# Patient Record
Sex: FEMALE | Race: BLACK OR AFRICAN AMERICAN | NOT HISPANIC OR LATINO | Employment: OTHER | URBAN - METROPOLITAN AREA
[De-identification: names, ages, dates, MRNs, and addresses within clinical notes are randomized per-mention and may not be internally consistent; named-entity substitution may affect disease eponyms.]

---

## 2017-02-16 PROBLEM — N18.2 STAGE 2 CHRONIC KIDNEY DISEASE: Status: ACTIVE | Noted: 2017-02-16

## 2017-02-16 PROBLEM — M79.605 PAIN OF LEFT LOWER EXTREMITY: Status: ACTIVE | Noted: 2017-02-16

## 2017-08-08 ENCOUNTER — HOSPITAL ENCOUNTER (EMERGENCY)
Facility: OTHER | Age: 72
Discharge: HOME | End: 2017-08-08
Attending: EMERGENCY MEDICINE
Payer: MEDICARE

## 2017-08-08 VITALS
SYSTOLIC BLOOD PRESSURE: 205 MMHG | BODY MASS INDEX: 39.69 KG/M2 | HEIGHT: 69 IN | OXYGEN SATURATION: 97 % | TEMPERATURE: 98 F | RESPIRATION RATE: 19 BRPM | HEART RATE: 76 BPM | DIASTOLIC BLOOD PRESSURE: 91 MMHG | WEIGHT: 268 LBS

## 2017-08-08 DIAGNOSIS — R06.02 SOB (SHORTNESS OF BREATH): ICD-10-CM

## 2017-08-08 DIAGNOSIS — R10.9 ABDOMINAL PAIN OF UNKNOWN ETIOLOGY: Primary | ICD-10-CM

## 2017-08-08 LAB
ALBUMIN SERPL BCP-MCNC: 3.6 G/DL
ALP SERPL-CCNC: 79 U/L
ALT SERPL W/O P-5'-P-CCNC: 19 U/L
AMPHET+METHAMPHET UR QL: NEGATIVE
ANION GAP SERPL CALC-SCNC: 11 MMOL/L
AST SERPL-CCNC: 18 U/L
BARBITURATES UR QL SCN>200 NG/ML: NEGATIVE
BASOPHILS # BLD AUTO: 0 K/UL
BASOPHILS NFR BLD: 0 %
BENZODIAZ UR QL SCN>200 NG/ML: NEGATIVE
BILIRUB SERPL-MCNC: 0.9 MG/DL
BILIRUB UR QL STRIP: NEGATIVE
BNP SERPL-MCNC: 159 PG/ML
BUN SERPL-MCNC: 14 MG/DL
BZE UR QL SCN: NEGATIVE
CALCIUM SERPL-MCNC: 9.2 MG/DL
CANNABINOIDS UR QL SCN: NEGATIVE
CHLORIDE SERPL-SCNC: 104 MMOL/L
CLARITY UR: CLEAR
CO2 SERPL-SCNC: 24 MMOL/L
COLOR UR: YELLOW
CREAT SERPL-MCNC: 1.1 MG/DL
CREAT UR-MCNC: 186.9 MG/DL
DIFFERENTIAL METHOD: ABNORMAL
EOSINOPHIL # BLD AUTO: 0 K/UL
EOSINOPHIL NFR BLD: 0.3 %
ERYTHROCYTE [DISTWIDTH] IN BLOOD BY AUTOMATED COUNT: 14.2 %
EST. GFR  (AFRICAN AMERICAN): 58 ML/MIN/1.73 M^2
EST. GFR  (NON AFRICAN AMERICAN): 50 ML/MIN/1.73 M^2
GLUCOSE SERPL-MCNC: 181 MG/DL
GLUCOSE UR QL STRIP: NEGATIVE
HCT VFR BLD AUTO: 45 %
HGB BLD-MCNC: 14.9 G/DL
HGB UR QL STRIP: NEGATIVE
KETONES UR QL STRIP: NEGATIVE
LEUKOCYTE ESTERASE UR QL STRIP: NEGATIVE
LYMPHOCYTES # BLD AUTO: 0.4 K/UL
LYMPHOCYTES NFR BLD: 5.2 %
MCH RBC QN AUTO: 30.8 PG
MCHC RBC AUTO-ENTMCNC: 33.1 G/DL
MCV RBC AUTO: 93 FL
METHADONE UR QL SCN>300 NG/ML: NEGATIVE
MONOCYTES # BLD AUTO: 0.2 K/UL
MONOCYTES NFR BLD: 2.4 %
NEUTROPHILS # BLD AUTO: 6.2 K/UL
NEUTROPHILS NFR BLD: 92 %
NITRITE UR QL STRIP: NEGATIVE
OPIATES UR QL SCN: NEGATIVE
PCP UR QL SCN>25 NG/ML: NEGATIVE
PH UR STRIP: 6 [PH] (ref 5–8)
PLATELET # BLD AUTO: 150 K/UL
PMV BLD AUTO: 12 FL
POCT GLUCOSE: 179 MG/DL (ref 70–110)
POTASSIUM SERPL-SCNC: 4.2 MMOL/L
PROT SERPL-MCNC: 8 G/DL
PROT UR QL STRIP: NEGATIVE
RBC # BLD AUTO: 4.84 M/UL
SODIUM SERPL-SCNC: 139 MMOL/L
SP GR UR STRIP: 1.02 (ref 1–1.03)
TOXICOLOGY INFORMATION: NORMAL
TROPONIN I SERPL DL<=0.01 NG/ML-MCNC: 0.02 NG/ML
URN SPEC COLLECT METH UR: NORMAL
UROBILINOGEN UR STRIP-ACNC: NEGATIVE EU/DL
WBC # BLD AUTO: 6.78 K/UL

## 2017-08-08 PROCEDURE — 82962 GLUCOSE BLOOD TEST: CPT

## 2017-08-08 PROCEDURE — 93010 ELECTROCARDIOGRAM REPORT: CPT | Mod: ,,, | Performed by: INTERNAL MEDICINE

## 2017-08-08 PROCEDURE — 96372 THER/PROPH/DIAG INJ SC/IM: CPT

## 2017-08-08 PROCEDURE — 80053 COMPREHEN METABOLIC PANEL: CPT

## 2017-08-08 PROCEDURE — 96374 THER/PROPH/DIAG INJ IV PUSH: CPT

## 2017-08-08 PROCEDURE — 96375 TX/PRO/DX INJ NEW DRUG ADDON: CPT

## 2017-08-08 PROCEDURE — 84484 ASSAY OF TROPONIN QUANT: CPT

## 2017-08-08 PROCEDURE — 25000003 PHARM REV CODE 250: Performed by: EMERGENCY MEDICINE

## 2017-08-08 PROCEDURE — 99284 EMERGENCY DEPT VISIT MOD MDM: CPT | Mod: 25

## 2017-08-08 PROCEDURE — 80307 DRUG TEST PRSMV CHEM ANLYZR: CPT

## 2017-08-08 PROCEDURE — 85025 COMPLETE CBC W/AUTO DIFF WBC: CPT

## 2017-08-08 PROCEDURE — 81003 URINALYSIS AUTO W/O SCOPE: CPT

## 2017-08-08 PROCEDURE — 93005 ELECTROCARDIOGRAM TRACING: CPT

## 2017-08-08 PROCEDURE — S0028 INJECTION, FAMOTIDINE, 20 MG: HCPCS | Performed by: EMERGENCY MEDICINE

## 2017-08-08 PROCEDURE — 83880 ASSAY OF NATRIURETIC PEPTIDE: CPT

## 2017-08-08 PROCEDURE — 63600175 PHARM REV CODE 636 W HCPCS: Performed by: EMERGENCY MEDICINE

## 2017-08-08 RX ORDER — DICYCLOMINE HYDROCHLORIDE 10 MG/ML
20 INJECTION INTRAMUSCULAR
Status: COMPLETED | OUTPATIENT
Start: 2017-08-08 | End: 2017-08-08

## 2017-08-08 RX ORDER — DICYCLOMINE HYDROCHLORIDE 20 MG/1
20 TABLET ORAL 2 TIMES DAILY
Qty: 20 TABLET | Refills: 0 | Status: SHIPPED | OUTPATIENT
Start: 2017-08-08 | End: 2017-09-07

## 2017-08-08 RX ORDER — HYDROMORPHONE HYDROCHLORIDE 1 MG/ML
0.5 INJECTION, SOLUTION INTRAMUSCULAR; INTRAVENOUS; SUBCUTANEOUS
Status: COMPLETED | OUTPATIENT
Start: 2017-08-08 | End: 2017-08-08

## 2017-08-08 RX ORDER — HYDRALAZINE HYDROCHLORIDE 20 MG/ML
10 INJECTION INTRAMUSCULAR; INTRAVENOUS
Status: COMPLETED | OUTPATIENT
Start: 2017-08-08 | End: 2017-08-08

## 2017-08-08 RX ORDER — ONDANSETRON 4 MG/1
4 TABLET, ORALLY DISINTEGRATING ORAL
Status: COMPLETED | OUTPATIENT
Start: 2017-08-08 | End: 2017-08-08

## 2017-08-08 RX ORDER — FAMOTIDINE 10 MG/ML
20 INJECTION INTRAVENOUS
Status: COMPLETED | OUTPATIENT
Start: 2017-08-08 | End: 2017-08-08

## 2017-08-08 RX ORDER — ONDANSETRON 4 MG/1
4 TABLET, FILM COATED ORAL EVERY 6 HOURS PRN
Qty: 12 TABLET | Refills: 0 | Status: SHIPPED | OUTPATIENT
Start: 2017-08-08 | End: 2018-02-26

## 2017-08-08 RX ADMIN — DICYCLOMINE HYDROCHLORIDE 20 MG: 10 INJECTION INTRAMUSCULAR at 11:08

## 2017-08-08 RX ADMIN — ONDANSETRON 4 MG: 4 TABLET, ORALLY DISINTEGRATING ORAL at 11:08

## 2017-08-08 RX ADMIN — LIDOCAINE HYDROCHLORIDE: 20 SOLUTION ORAL; TOPICAL at 01:08

## 2017-08-08 RX ADMIN — HYDROMORPHONE HYDROCHLORIDE 0.5 MG: 1 INJECTION, SOLUTION INTRAMUSCULAR; INTRAVENOUS; SUBCUTANEOUS at 01:08

## 2017-08-08 RX ADMIN — FAMOTIDINE 20 MG: 10 INJECTION INTRAVENOUS at 01:08

## 2017-08-08 RX ADMIN — HYDRALAZINE HYDROCHLORIDE 10 MG: 20 INJECTION INTRAMUSCULAR; INTRAVENOUS at 11:08

## 2017-08-08 NOTE — ED NOTES
C/o chronic N/V/D w/ SOB x3 weeks, reports hx of CHF, reports non compliant w/ diuretics, no hx of CHF or diuretics, hypertensive
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
## 2017-08-08 NOTE — ED NOTES
Pt states she is not feeling any better, and appears anxious, respirations even and unlabored. MD notified of Pt status.

## 2018-02-06 PROBLEM — R53.83 FATIGUE: Status: ACTIVE | Noted: 2018-02-06

## 2018-02-26 ENCOUNTER — HOSPITAL ENCOUNTER (EMERGENCY)
Facility: OTHER | Age: 73
Discharge: HOME | End: 2018-02-26
Attending: EMERGENCY MEDICINE
Payer: MEDICARE

## 2018-02-26 VITALS
DIASTOLIC BLOOD PRESSURE: 57 MMHG | SYSTOLIC BLOOD PRESSURE: 128 MMHG | OXYGEN SATURATION: 95 % | RESPIRATION RATE: 20 BRPM | TEMPERATURE: 99 F | HEART RATE: 70 BPM

## 2018-02-26 DIAGNOSIS — J06.9 VIRAL URI WITH COUGH: ICD-10-CM

## 2018-02-26 DIAGNOSIS — R06.02 SOB (SHORTNESS OF BREATH): ICD-10-CM

## 2018-02-26 DIAGNOSIS — R10.13 ABDOMINAL DISCOMFORT, EPIGASTRIC: Primary | ICD-10-CM

## 2018-02-26 LAB
ALBUMIN SERPL BCP-MCNC: 3.6 G/DL
ALP SERPL-CCNC: 83 U/L
ALT SERPL W/O P-5'-P-CCNC: 16 U/L
ANION GAP SERPL CALC-SCNC: 14 MMOL/L
AST SERPL-CCNC: 19 U/L
BASOPHILS # BLD AUTO: 0.02 K/UL
BASOPHILS NFR BLD: 0.2 %
BILIRUB SERPL-MCNC: 0.6 MG/DL
BNP SERPL-MCNC: 73 PG/ML
BUN SERPL-MCNC: 17 MG/DL
CALCIUM SERPL-MCNC: 9.8 MG/DL
CHLORIDE SERPL-SCNC: 99 MMOL/L
CO2 SERPL-SCNC: 25 MMOL/L
CREAT SERPL-MCNC: 1.1 MG/DL
DIFFERENTIAL METHOD: NORMAL
EOSINOPHIL # BLD AUTO: 0.2 K/UL
EOSINOPHIL NFR BLD: 2.7 %
ERYTHROCYTE [DISTWIDTH] IN BLOOD BY AUTOMATED COUNT: 13.8 %
EST. GFR  (AFRICAN AMERICAN): 57 ML/MIN/1.73 M^2
EST. GFR  (NON AFRICAN AMERICAN): 50 ML/MIN/1.73 M^2
FLUAV AG SPEC QL IA: NEGATIVE
FLUBV AG SPEC QL IA: NEGATIVE
GLUCOSE SERPL-MCNC: 148 MG/DL
HCT VFR BLD AUTO: 42.9 %
HGB BLD-MCNC: 14 G/DL
LIPASE SERPL-CCNC: 16 U/L
LYMPHOCYTES # BLD AUTO: 1.9 K/UL
LYMPHOCYTES NFR BLD: 22.9 %
MCH RBC QN AUTO: 30.6 PG
MCHC RBC AUTO-ENTMCNC: 32.6 G/DL
MCV RBC AUTO: 94 FL
MONOCYTES # BLD AUTO: 0.5 K/UL
MONOCYTES NFR BLD: 5.8 %
NEUTROPHILS # BLD AUTO: 5.5 K/UL
NEUTROPHILS NFR BLD: 68.2 %
PLATELET # BLD AUTO: 210 K/UL
PMV BLD AUTO: 12.2 FL
POCT GLUCOSE: 146 MG/DL (ref 70–110)
POTASSIUM SERPL-SCNC: 4.5 MMOL/L
PROT SERPL-MCNC: 8.1 G/DL
RBC # BLD AUTO: 4.57 M/UL
SODIUM SERPL-SCNC: 138 MMOL/L
SPECIMEN SOURCE: NORMAL
TROPONIN I SERPL DL<=0.01 NG/ML-MCNC: 0.01 NG/ML
WBC # BLD AUTO: 8.08 K/UL

## 2018-02-26 PROCEDURE — 85025 COMPLETE CBC W/AUTO DIFF WBC: CPT

## 2018-02-26 PROCEDURE — 96375 TX/PRO/DX INJ NEW DRUG ADDON: CPT

## 2018-02-26 PROCEDURE — 63600175 PHARM REV CODE 636 W HCPCS: Performed by: PHYSICIAN ASSISTANT

## 2018-02-26 PROCEDURE — 83880 ASSAY OF NATRIURETIC PEPTIDE: CPT

## 2018-02-26 PROCEDURE — 80053 COMPREHEN METABOLIC PANEL: CPT

## 2018-02-26 PROCEDURE — 25500020 PHARM REV CODE 255: Performed by: EMERGENCY MEDICINE

## 2018-02-26 PROCEDURE — 82962 GLUCOSE BLOOD TEST: CPT

## 2018-02-26 PROCEDURE — 93010 ELECTROCARDIOGRAM REPORT: CPT | Mod: ,,, | Performed by: INTERNAL MEDICINE

## 2018-02-26 PROCEDURE — 84484 ASSAY OF TROPONIN QUANT: CPT

## 2018-02-26 PROCEDURE — 96374 THER/PROPH/DIAG INJ IV PUSH: CPT

## 2018-02-26 PROCEDURE — 83690 ASSAY OF LIPASE: CPT

## 2018-02-26 PROCEDURE — 25000003 PHARM REV CODE 250: Performed by: PHYSICIAN ASSISTANT

## 2018-02-26 PROCEDURE — 99285 EMERGENCY DEPT VISIT HI MDM: CPT | Mod: 25

## 2018-02-26 PROCEDURE — 87400 INFLUENZA A/B EACH AG IA: CPT

## 2018-02-26 RX ORDER — CARVEDILOL 12.5 MG/1
25 TABLET ORAL 2 TIMES DAILY
Status: DISCONTINUED | OUTPATIENT
Start: 2018-02-26 | End: 2018-02-27 | Stop reason: HOSPADM

## 2018-02-26 RX ORDER — METHYLPREDNISOLONE 4 MG/1
4 TABLET ORAL DAILY
COMMUNITY
End: 2018-06-04

## 2018-02-26 RX ORDER — ONDANSETRON 2 MG/ML
4 INJECTION INTRAMUSCULAR; INTRAVENOUS
Status: COMPLETED | OUTPATIENT
Start: 2018-02-26 | End: 2018-02-26

## 2018-02-26 RX ORDER — HYOSCYAMINE SULFATE 0.5 MG/ML
0.5 INJECTION, SOLUTION SUBCUTANEOUS
Status: COMPLETED | OUTPATIENT
Start: 2018-02-26 | End: 2018-02-26

## 2018-02-26 RX ORDER — ONDANSETRON 4 MG/1
4 TABLET, FILM COATED ORAL EVERY 6 HOURS PRN
Qty: 12 TABLET | Refills: 0 | Status: SHIPPED | OUTPATIENT
Start: 2018-02-26

## 2018-02-26 RX ADMIN — HYOSCYAMINE SULFATE 0.5 MG: 0.5 INJECTION, SOLUTION SUBCUTANEOUS at 09:02

## 2018-02-26 RX ADMIN — IOHEXOL 75 ML: 350 INJECTION, SOLUTION INTRAVENOUS at 08:02

## 2018-02-26 RX ADMIN — ONDANSETRON 4 MG: 2 INJECTION, SOLUTION INTRAMUSCULAR; INTRAVENOUS at 06:02

## 2018-02-26 RX ADMIN — CARVEDILOL 25 MG: 12.5 TABLET, FILM COATED ORAL at 07:02

## 2018-02-26 NOTE — ED NOTES
Pt recently diagnosed with influenza by PCP this past week. Pt reporting she was prescribed oral steroids, tamiflu, and guaifenesin cough syrup with codeine x couple days ago. Pt reporting she began feeling worse, with epigastric pain , nausea, vomiting, and diarrhea since taking the medications. Pt with mild increase in RR. Pt also productive green cough. Pt AAOx4 and appropriate at this time. No acute distress noted.

## 2018-02-26 NOTE — ED NOTES
Appearance: Pt awake, alert & oriented to person, place & time. Pt in no acute distress at present time. Pt is clean and well groomed with clothes appropriately fastened. SEE HPI.   Skin: Skin warm, dry & intact. Color consistent with ethnicity. Mucous membranes moist. No breakdown or brusing noted.   Musculoskeletal: Patient moving all extremities well, no obvious swelling or deformities noted.   Respiratory: Mild increase in RR. Visible chest rise noted. Airway is open and patent. No accessory muscle use noted.   Neurologic: Sensation is intact. Speech is clear and appropriate. Eyes open spontaneously, behavior appropriate to situation, follows commands, facial expression symmetrical, bilateral hand grasp equal and even, purposeful motor response noted.  Cardiac: All peripheral pulses present. No Bilateral lower extremity edema. Cap refill is <3 seconds. Pt reporting SOB, generalized weakness and fatigue related to recent influenza diagnosis.   Abdomen: Pt with pain to epigastric pain with nausea, vomiting, and an episode of diarrhea this AM.   : Pt reports no dysuria or hematuria.

## 2018-02-27 ENCOUNTER — PES CALL (OUTPATIENT)
Dept: ADMINISTRATIVE | Facility: CLINIC | Age: 73
End: 2018-02-27

## 2018-02-27 NOTE — ED NOTES
Assisted pt onto bedside commode. Pt tolerated well. Pt given call light in reach and instructed to use call light to call nurses station upon completing.

## 2018-02-27 NOTE — ED NOTES
"pt sounds winded when speaking, denies current SOB; reports SOB with exertion. Asked pt if her current breathing pattern is her baseline, states "yes". O2% > 95%, RR 32.   "

## 2018-02-27 NOTE — ED PROVIDER NOTES
"Encounter Date: 2/26/2018       History     Chief Complaint   Patient presents with    Influenza     Pt states "my stomach is hurting so bad from the medicines from the flu medicines". Pt looks SOB, c/o stomach pain and diagnosed with the flu.    Abdominal Pain     Patient is a 72-year-old female with CAD, diabetes, hypertension and MI who presents to the ED with multiple complaints.  Patient reports flulike symptoms for the past week.  Patient states her primary care provider is treating her for the flu.  She was prescribed Tamiflu, Medrol Dosepak and cough medicine.  Patient reports epigastric pain and nausea after taking this medicine.  She also reports shortness of breath and productive cough with green sputum.  Patient states she had a myocardial infarction 3 weeks ago and was admitted to [facility].  Patient states she was given nitroglycerin and other medications she does not remember the name of.  Patient states her abdominal pain is a chronic issue, however, it was exacerbated after taking the Tamiflu.  Patient denies chest pain.      The history is provided by the patient.     Review of patient's allergies indicates:   Allergen Reactions    Aspirin Hives     Can tolerate baby aspirin.    Fish containing products     Nsaids (non-steroidal anti-inflammatory drug) Hives     Past Medical History:   Diagnosis Date    Arthritis     Cataract     Coronary artery disease 6/6/2013 5/2013: ACS - CARLEEN distal RCA.    Diabetes mellitus     Diabetes mellitus type II 4/22/2013    Diagnosed 1993.    Diabetic retinopathy     Hypercholesterolemia 4/22/2013    Hypertension     Hypertension, benign 4/22/2013    Diagnosed 1993.    Myocardial infarction, subendocardial, subsequent care 6/6/2013 5/2013: ACS- CARLEEN distal RCA.     Past Surgical History:   Procedure Laterality Date    CHOLECYSTECTOMY      HYSTERECTOMY       Family History   Problem Relation Age of Onset    Cancer Mother     Diabetes Mother     " Stroke Father     Diabetes Sister     Hypertension Sister     Cancer Brother     Diabetes Brother     Hypertension Brother     Amblyopia Neg Hx     Blindness Neg Hx      Social History   Substance Use Topics    Smoking status: Never Smoker    Smokeless tobacco: Never Used    Alcohol use No     Review of Systems   Constitutional: Positive for chills. Negative for fever.   HENT: Positive for congestion. Negative for sore throat.    Eyes: Negative for pain.   Respiratory: Positive for cough and shortness of breath.    Cardiovascular: Negative for chest pain.   Gastrointestinal: Positive for abdominal pain and nausea. Negative for diarrhea and vomiting.   Genitourinary: Negative for dysuria.   Musculoskeletal: Negative for arthralgias.   Skin: Negative for rash.   Neurological: Negative for headaches.       Physical Exam     Initial Vitals [02/26/18 1615]   BP Pulse Resp Temp SpO2   (!) 232/106 70 (!) 28 -- 99 %      MAP       148         Physical Exam    Constitutional: She is cooperative.   -American female who appears short of breath.   HENT:   Head: Normocephalic and atraumatic.   Mouth/Throat: Oropharynx is clear and moist.   Eyes: Conjunctivae and EOM are normal. Pupils are equal, round, and reactive to light.   Neck: Normal range of motion. Neck supple.   Cardiovascular: Normal rate, regular rhythm and intact distal pulses.   Pulmonary/Chest: Breath sounds normal. No respiratory distress. She has no wheezes. She has no rhonchi. She has no rales.   tachypneic   Abdominal: Soft. Bowel sounds are normal.   Tenderness to palpation in epigastrium.  No rebound tenderness or guarding.   Musculoskeletal: Normal range of motion. She exhibits no edema.   Neurological: She is alert and oriented to person, place, and time. She has normal strength. No cranial nerve deficit. GCS eye subscore is 4. GCS verbal subscore is 5. GCS motor subscore is 6.   Skin: Skin is warm and dry. Capillary refill takes less than  2 seconds. No rash noted.   Psychiatric: She has a normal mood and affect. Her behavior is normal.         ED Course   Procedures  Labs Reviewed   POCT GLUCOSE - Abnormal; Notable for the following:        Result Value    POCT Glucose 146 (*)     All other components within normal limits   CBC W/ AUTO DIFFERENTIAL   COMPREHENSIVE METABOLIC PANEL   INFLUENZA A AND B ANTIGEN   LIPASE   TROPONIN I   B-TYPE NATRIURETIC PEPTIDE   POCT GLUCOSE MONITORING CONTINUOUS     EKG Readings: (Independently Interpreted)   Normal sinus rhythm at a rate of 60 bpm.  No STEMI.  T-wave inversion in lead II, III, aVF, V4-V6.  No change from previous EKG in August 2017     Imaging Results          CTA Chest Non-Coronary (PE Study) (Final result)  Result time 02/26/18 20:39:53    Final result by Salinas Costa MD (02/26/18 20:39:53)                 Impression:       No evidence of central pulmonary embolism.  Distal and subsegmental pulmonary emboli are not excluded due to motion.  Additional evaluation, as clinically warranted.    No focal consolidation or airspace opacities.    Coronary artery calcifications.    Additional findings as above.              Electronically signed by: SALINAS COSTA MD  Date:     02/26/18  Time:    20:39              Narrative:    Exam: [id number]  02/26/18  19:49:33 [id number] ([facility]) : CTA CHEST NON CORONARY    Technique:     CT images of the chest obtained during pulmonary arterial phase after injection of 75 cc Omnipaque IV contrast. Coronal and Sagittal Reformats were obtained.       Comparison:    Chest x-ray dated 02/28/2018 CT scan of the abdomen dated 10/15/2015.    Findings:      CT pulmonary embolism examination:  There are no filling defects within the central pulmonary tree.  The distal end subsegmental pulmonary tree are poorly visualized secondary to motion.  There is no evidence of enlargement of the pulmonary tree.  No evidence of pulmonary infarction is identified.    CT chest examination:  The thyroid  gland is diminutive in appearance.  The base of the neck is within normal limits.  The supraclavicular regions are unremarkable.    The trachea and the central airways are within normal limits.  No endobronchial lesion is identified.  There are minimal areas of bronchiectasis in the bilateral lower lobes.    The heart is unremarkable.  There is no evidence of intracardiac thrombus.  There are extensive coronary artery calcifications.  No pericardial effusions are present.    There is normal appearance of the thoracic aorta.  No intimal flap is noted suggestive dissection.  There are calcifications of the visualized portions of the abdominal aorta.  The great vessels arising from aortic arch are within normal limits.    There is no evidence of lymphadenopathy in the chest.  The axillary regions are within normal limits.    There are no pleural effusions.  There is no evidence of a pneumothorax.  There is no evidence of pneumomediastinum.  No airspace opacities are present.  There is a partially calcified 5 mm nodule in the left lung base.  The areas of subsegmental atelectasis in the bilateral lower lobes.    The esophagus is unremarkable.  The visualized upper abdominal structures are unremarkable.  There is no evidence of free air the upper abdomen.    There are postoperative changes of median sternotomy.  The sternal wires are unremarkable.  There are degenerative changes in the osseous structures.  No evidence of a fracture is identified.                             X-Ray Chest PA And Lateral (Final result)  Result time 02/26/18 18:11:19    Final result by Joaquin Wise MD (02/26/18 18:11:19)                 Impression:         Grossly stable chronic findings, no acute cardiopulmonary process..          Electronically signed by: JOAQUIN WISE MD  Date:     02/26/18  Time:    18:11              Narrative:    Chest PA and lateral    Indication:Shortness of breath    Comparison:8/8/2017    Findings:  The  cardiomediastinal silhouette is not enlarged, noting postsurgical changes and calcification of the aorta.  There is no pleural effusion.  The trachea is midline.  The lungs are symmetrically expanded bilaterally mildly coarse interstitial attenuation and bilateral basilar subsegmental atelectasis or scarring, left and right. No large focal consolidation seen.  There is no pneumothorax.  The osseous structures remarkable for degenerative changes.                               Medical Decision Making:   Initial Assessment:   Urgent evaluation of a 72 y.o. Female presenting to the emergency department complaining of abdominal pain and SOB. Patient is afebrile, nontoxic appearing and hemodynamically stable.  ED Management:  Patient with multiple complaints.  Patient short of breath and tachypnea, exam.  She is not hypoxic.  Patient's abdominal pain is a chronic issue.  I'm more concerned with her shortness of breath.  Will test patient for the flu, as she is being treated for this and is having medication side effects.  Lab work and imaging will be obtained.  Patient will be given Zofran and Levsin for her symptoms.  EKG reveals T-wave inversion, however this is seen in previous EKG.  Chest x-ray reveals mild coarse interstitial attenuation bilaterally.  No focal consolidation seen.  CT will be obtained for further workup of patient's dyspnea.  Lab work reveals a normal troponin.  Normal BNP.  No leukocytosis.  No anemia.  Glucose is 148.  I do not suspect DKA.  No electrolyte anomalies.  Rapid flu swab is negative.  CTA reveals no evidence of central pulmonary embolism.  There is no focal consolidation or opacities.  Coronary artery calcifications are present, this is known to the patient enjoys cardiology follow-up.  The visualized upper abdominal structures are unremarkable.'  Upon reevaluation, patient reports minimal relief of her abdominal pain.  Patient sees CLIFF Padilla.  Patient states she has never had an  upper GI scope.  I have educated her that this is probably the next best step is to follow-up with him.  I advised the patient continue taking her Medrol Dosepak as prescribed her PCP monitor her glucose closely.  I advised that she immediately stopped taking the Tamiflu as this is likely exacerbating her abdominal pain.  She is amenable to this plan.  She is stable for discharge. I have given her specific return precautions for new or worsening symptoms. I have discussed the patient with the attending thoroughly and he/she agrees to the treatment and plan.   This note was created using Dragon Medical Dictation. There may be typographical errors secondary to dictation.                       Clinical Impression:     1. Abdominal discomfort, epigastric    2. SOB (shortness of breath)    3. Viral URI with cough                             Clarke Samaniego PA-C  02/27/18 0308

## 2018-03-27 PROBLEM — J30.1 ALLERGIC RHINITIS DUE TO POLLEN: Status: ACTIVE | Noted: 2018-03-27

## 2018-03-27 PROBLEM — R20.0 NUMBNESS AND TINGLING IN RIGHT HAND: Status: ACTIVE | Noted: 2018-03-27

## 2018-03-27 PROBLEM — R20.2 NUMBNESS AND TINGLING IN RIGHT HAND: Status: ACTIVE | Noted: 2018-03-27

## 2018-06-04 PROBLEM — Z71.89 ENCOUNTER FOR MEDICATION REVIEW AND COUNSELING: Status: ACTIVE | Noted: 2018-06-04

## 2018-06-04 PROBLEM — L03.115 CELLULITIS OF RIGHT LOWER EXTREMITY: Status: ACTIVE | Noted: 2018-06-04

## 2018-09-11 PROBLEM — R52 BODY ACHES: Status: ACTIVE | Noted: 2018-09-11

## 2018-12-05 PROBLEM — M79.89 SWELLING OF LEFT UPPER EXTREMITY: Status: ACTIVE | Noted: 2018-12-05

## 2018-12-05 PROBLEM — M79.89 SWELLING OF BOTH LOWER EXTREMITIES: Status: ACTIVE | Noted: 2018-12-05

## 2020-05-19 NOTE — ED NOTES
Pt assisted back into bed from bedside commode.    Mart-1 - Negative Histology Text: MART-1 staining demonstrates a normal density and pattern of melanocytes along the dermal-epidermal junction. The surgical margins are negative for tumor cells.

## 2025-08-07 ENCOUNTER — OFFICE VISIT (OUTPATIENT)
Dept: URGENT CARE | Facility: CLINIC | Age: 80
End: 2025-08-07
Payer: MEDICARE

## 2025-08-07 VITALS
DIASTOLIC BLOOD PRESSURE: 62 MMHG | WEIGHT: 198.44 LBS | RESPIRATION RATE: 20 BRPM | TEMPERATURE: 98 F | BODY MASS INDEX: 29.39 KG/M2 | HEIGHT: 69 IN | SYSTOLIC BLOOD PRESSURE: 143 MMHG | OXYGEN SATURATION: 100 % | HEART RATE: 75 BPM

## 2025-08-07 DIAGNOSIS — B96.89 BACTERIAL RESPIRATORY INFECTION: Primary | ICD-10-CM

## 2025-08-07 DIAGNOSIS — J98.8 BACTERIAL RESPIRATORY INFECTION: Primary | ICD-10-CM

## 2025-08-07 PROCEDURE — 99214 OFFICE O/P EST MOD 30 MIN: CPT | Mod: S$GLB,,,

## 2025-08-07 RX ORDER — FLUTICASONE PROPIONATE 50 MCG
1 SPRAY, SUSPENSION (ML) NASAL 2 TIMES DAILY PRN
Qty: 16 G | Refills: 0 | Status: SHIPPED | OUTPATIENT
Start: 2025-08-07

## 2025-08-07 RX ORDER — BENZONATATE 200 MG/1
200 CAPSULE ORAL 3 TIMES DAILY PRN
Qty: 30 CAPSULE | Refills: 0 | Status: SHIPPED | OUTPATIENT
Start: 2025-08-07 | End: 2025-08-22

## 2025-08-07 RX ORDER — CETIRIZINE HYDROCHLORIDE 10 MG/1
10 TABLET ORAL DAILY
Qty: 30 TABLET | Refills: 0 | Status: SHIPPED | OUTPATIENT
Start: 2025-08-07 | End: 2025-09-06

## 2025-08-07 RX ORDER — DOXYCYCLINE 100 MG/1
100 CAPSULE ORAL EVERY 12 HOURS
Qty: 14 CAPSULE | Refills: 0 | Status: SHIPPED | OUTPATIENT
Start: 2025-08-07 | End: 2025-08-14

## 2025-08-07 RX ORDER — ALBUTEROL SULFATE 90 UG/1
1-2 INHALANT RESPIRATORY (INHALATION) EVERY 4 HOURS PRN
Qty: 18 G | Refills: 0 | Status: SHIPPED | OUTPATIENT
Start: 2025-08-07 | End: 2025-09-06

## 2025-08-07 NOTE — PROGRESS NOTES
"Subjective:      Patient ID: Anneliese Rodriguez is a 79 y.o. female.    Vitals:  height is 5' 9" (1.753 m) and weight is 90 kg (198 lb 6.6 oz). Her oral temperature is 98 °F (36.7 °C). Her blood pressure is 143/62 (abnormal) and her pulse is 75. Her respiration is 20 and oxygen saturation is 100%.     Chief Complaint: Sinus Problem    79-year-old female with a PMH of HTN, HLD, CKD, DM2 presents to the clinic today with chief complaint of cough, body aches, weakness, nausea, headache, sneezing, sore throat, chills, sweats, shortness of breath, fatigue. Symptoms started 11 days ago and have not improved.  Patient has taken tylenol.   She states her  was sick that she was around. Denies any recent travel.  Denies history of seasonal allergies. Denies hx of asthma.  Denies any pain or radiation of pain. Denies fever, chest pain, wheezing, abdominal pain, vomiting, diarrhea, or rashes.      Sinus Problem  This is a new problem. The current episode started 1 to 4 weeks ago. The problem has been gradually worsening since onset. There has been no fever. Her pain is at a severity of 10/10 (chest pain when coughing). The pain is severe. Associated symptoms include chills, congestion, coughing, diaphoresis, headaches, shortness of breath, sneezing and a sore throat. Pertinent negatives include no ear pain, hoarse voice, neck pain or sinus pressure. Past treatments include acetaminophen. The treatment provided mild relief.     Constitution: Positive for chills, sweating and fatigue. Negative for activity change, appetite change, fever, generalized weakness and international travel in last 60 days.   HENT:  Positive for congestion and sore throat. Negative for ear pain, postnasal drip, sinus pain, sinus pressure and trouble swallowing.    Neck: Negative for neck pain.   Cardiovascular:  Negative for chest pain.   Respiratory:  Positive for cough and shortness of breath. Negative for chest tightness, sputum production, " wheezing and asthma.    Gastrointestinal:  Positive for nausea. Negative for abdominal pain, vomiting and diarrhea.   Musculoskeletal:  Positive for muscle ache.   Skin:  Negative for rash.   Allergic/Immunologic: Positive for sneezing. Negative for environmental allergies, seasonal allergies and asthma.   Neurological:  Positive for headaches.      Objective:     Physical Exam   Constitutional: She is oriented to person, place, and time. She appears well-developed. She is cooperative.  Non-toxic appearance. She appears ill. No distress.   HENT:   Head: Normocephalic and atraumatic.   Ears:   Right Ear: Hearing, external ear and ear canal normal. A middle ear effusion is present.   Left Ear: Hearing, external ear and ear canal normal. A middle ear effusion is present.   Nose: Mucosal edema and rhinorrhea present. No purulent discharge or nasal deformity. No epistaxis. Right sinus exhibits no maxillary sinus tenderness and no frontal sinus tenderness. Left sinus exhibits no maxillary sinus tenderness and no frontal sinus tenderness.   Mouth/Throat: Uvula is midline, oropharynx is clear and moist and mucous membranes are normal. No trismus in the jaw. Normal dentition. No uvula swelling. Cobblestoning present. No oropharyngeal exudate, posterior oropharyngeal edema, posterior oropharyngeal erythema or tonsillar abscesses. Tonsils are 0 on the right. Tonsils are 0 on the left. No tonsillar exudate.   Eyes: Conjunctivae and lids are normal. No scleral icterus. Extraocular movement intact   Neck: Trachea normal and phonation normal. Neck supple. No edema present. No erythema present. No neck rigidity present.   Cardiovascular: Normal rate, regular rhythm, normal heart sounds and normal pulses.   Pulmonary/Chest: Effort normal. No stridor. No respiratory distress. She has no decreased breath sounds. She has wheezes in the right upper field and the left upper field. She has no rhonchi. She has no rales.   Abdominal: Normal  appearance.   Musculoskeletal: Normal range of motion.         General: No deformity. Normal range of motion.   Lymphadenopathy:     She has no cervical adenopathy.   Neurological: She is alert and oriented to person, place, and time. She exhibits normal muscle tone. Coordination normal.   Skin: Skin is warm, dry, intact, not diaphoretic and not pale.   Psychiatric: Her speech is normal and behavior is normal. Judgment and thought content normal.   Nursing note and vitals reviewed.      Assessment:     1. Bacterial respiratory infection        Plan:       Bacterial respiratory infection  -     benzonatate (TESSALON) 200 MG capsule; Take 1 capsule (200 mg total) by mouth 3 (three) times daily as needed for Cough.  Dispense: 30 capsule; Refill: 0  -     albuterol (PROVENTIL/VENTOLIN HFA) 90 mcg/actuation inhaler; Inhale 1-2 puffs into the lungs every 4 (four) hours as needed for Shortness of Breath or Wheezing. Rescue  Dispense: 18 g; Refill: 0  -     fluticasone propionate (FLONASE) 50 mcg/actuation nasal spray; 1 spray (50 mcg total) by Each Nostril route 2 (two) times daily as needed for Rhinitis.  Dispense: 16 g; Refill: 0  -     cetirizine (ZYRTEC) 10 MG tablet; Take 1 tablet (10 mg total) by mouth once daily.  Dispense: 30 tablet; Refill: 0  -     doxycycline (VIBRAMYCIN) 100 MG Cap; Take 1 capsule (100 mg total) by mouth every 12 (twelve) hours. for 7 days  Dispense: 14 capsule; Refill: 0

## 2025-08-07 NOTE — PATIENT INSTRUCTIONS
The CDC recommends should the patient develop a fever or starts to feel worse after they have returned to normal activities, they should return home and away from others for at least another 24 hours.     Please drink plenty of fluids.  Please get plenty of rest.  Please return here or go to the Emergency Department for any concerns or worsening of condition.  If you were prescribed doxycycline, please take them to completion.  If you were prescribed a narcotic medication, do not drive or operate heavy equipment or machinery while taking these medications.  Recommended using albuterol inhaler at least twice today and then starting tomorrow use as needed every 4-6 hours. Use flonase nasal spray twice daily (use 30 minutes before bedtime at night) and take daily zyrtec as directed for five-ten days. Use tessalon perles for cough as needed. Recommended taking vitamin D and zinc supplements for immune support.     Recommended for patient to drink hot tea with honey. Consider eating softer foods such as soup and broth for the next couple of days to prevent further throat irritation. Recommended for patient to refrain from acidic foods (such as tomatoes or caffeine) to prevent throat irritation for the next couple of days.   Recommend switching out tooth brushes once patient feels better. Recommend cleaning house using disinfectant and washing sheets once patient is healed. Recommend healthy diet, smoothie detox, and tumeric or ginger juice shots either while patient is sick or after patient has healed to help with overall inflammation and immunity support.   Recommend humidifier, hot showers for sinus drainage. Recommend elevating your head at night with two pillows to prevent post nasal drip and cough at night. Recommend over the counter benadryl allergy plus congestion that includes a nasal decongestant in it if you do not have good results with nasal spray at bed time.     If you do not have Hypertension or any  history of palpitations, it is ok to take over the counter Sudafed or Mucinex D or Allegra-D or Claritin-D or Zyrtec-D.  If you do take one of the above, it is ok to combine that with plain over the counter Mucinex or Allegra or Claritin or Zyrtec.  If for example you are taking Zyrtec -D, you can combine that with Mucinex, but not Mucinex-D.  If you are taking Mucinex-D, you can combine that with plain Allegra or Claritin or Zyrtec.   If you do have Hypertension or palpitations, it is safe to take Coricidin HBP for relief of sinus symptoms.  If not allergic, please take over the counter Tylenol (Acetaminophen) and/or Motrin (Ibuprofen) as directed for control of pain and/or fever.  Please follow up with your primary care doctor or specialist as needed.    If you  smoke, please stop smoking.